# Patient Record
Sex: MALE | Race: WHITE | NOT HISPANIC OR LATINO | URBAN - METROPOLITAN AREA
[De-identification: names, ages, dates, MRNs, and addresses within clinical notes are randomized per-mention and may not be internally consistent; named-entity substitution may affect disease eponyms.]

---

## 2019-04-04 ENCOUNTER — EMERGENCY (EMERGENCY)
Facility: HOSPITAL | Age: 29
LOS: 1 days | Discharge: HOME | End: 2019-04-04
Attending: EMERGENCY MEDICINE | Admitting: EMERGENCY MEDICINE
Payer: COMMERCIAL

## 2019-04-04 VITALS
TEMPERATURE: 98 F | RESPIRATION RATE: 18 BRPM | HEART RATE: 77 BPM | OXYGEN SATURATION: 99 % | SYSTOLIC BLOOD PRESSURE: 135 MMHG | DIASTOLIC BLOOD PRESSURE: 76 MMHG

## 2019-04-04 VITALS
DIASTOLIC BLOOD PRESSURE: 90 MMHG | TEMPERATURE: 98 F | HEART RATE: 66 BPM | RESPIRATION RATE: 16 BRPM | SYSTOLIC BLOOD PRESSURE: 136 MMHG | OXYGEN SATURATION: 98 %

## 2019-04-04 PROCEDURE — 99284 EMERGENCY DEPT VISIT MOD MDM: CPT

## 2019-04-04 NOTE — ED ADULT NURSE NOTE - OBJECTIVE STATEMENT
Facilitator RN - Pt. received in room 21, A&Ox4, ambulatory. Pt. c/o left testicular pain that began at 7:45 pm tonight. Pt. reports having similar episodes in the past lasting about 10-20 minutes, did not see MD for previous episodes. Denies recent trauma, sexual activity, dysuria, abdominal pain, n/v/d, fever, chills, chest pain, SOB. Respirations are even and unlabored on room air. VS as noted. MD Nam & MD Rasmussen at bedside for evaluation. Awaiting ultrasound at present. Report given to primary RN Marii HALL

## 2019-04-04 NOTE — ED ADULT TRIAGE NOTE - CHIEF COMPLAINT QUOTE
c/o testicular pain since 7:45pm. 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 Offers no other complaints.

## 2019-04-05 LAB
APPEARANCE UR: CLEAR — SIGNIFICANT CHANGE UP
BILIRUB UR-MCNC: NEGATIVE — SIGNIFICANT CHANGE UP
BLOOD UR QL VISUAL: NEGATIVE — SIGNIFICANT CHANGE UP
COLOR SPEC: COLORLESS — SIGNIFICANT CHANGE UP
GLUCOSE UR-MCNC: NEGATIVE — SIGNIFICANT CHANGE UP
KETONES UR-MCNC: NEGATIVE — SIGNIFICANT CHANGE UP
LEUKOCYTE ESTERASE UR-ACNC: NEGATIVE — SIGNIFICANT CHANGE UP
NITRITE UR-MCNC: NEGATIVE — SIGNIFICANT CHANGE UP
PH UR: 8 — SIGNIFICANT CHANGE UP (ref 5–8)
PROT UR-MCNC: NEGATIVE — SIGNIFICANT CHANGE UP
SP GR SPEC: 1.01 — SIGNIFICANT CHANGE UP (ref 1–1.04)
UROBILINOGEN FLD QL: NORMAL — SIGNIFICANT CHANGE UP

## 2019-04-05 PROCEDURE — 76870 US EXAM SCROTUM: CPT | Mod: 26

## 2019-04-05 NOTE — ED PROVIDER NOTE - OBJECTIVE STATEMENT
Attending/Eneida: 30 yo M p/w atraumatic acute left testicular pain. Pt noted onset ~1930 tonight, 5/10 now reduced to 1-2/10. Denies penile d/c, dysuria, abd pain. He notes prior similar episodes however did not follow up with PMD or a urologist for further evaluation./

## 2019-04-05 NOTE — ED PROVIDER NOTE - CLINICAL SUMMARY MEDICAL DECISION MAKING FREE TEXT BOX
A/P 28 yo M p/w mild to mod left testicular pain, unlikely torsion, epididymitis, no masses  -US, UA,  f/i

## 2019-04-05 NOTE — ED PROVIDER NOTE - NSFOLLOWUPINSTRUCTIONS_ED_ALL_ED_FT
Please follow up with your primary care physician in 24 to 48 hours.  If you have worsening testicular pain, please return to the ER.  Please follow up with a Urologist for further management of your testicular pain and bilateral hydroceles.

## 2019-04-05 NOTE — ED PROVIDER NOTE - PHYSICAL EXAMINATION
Attending/Eneida: Well-appearing, NAD; PERRL/EOMI, non-icterus, supple, no KANDACE, no JVD, RRR, CTAB; Abd-soft, NT/ND, no HSM; no LE edema, A&Ox3, nonfocal; Skin-warm/dry  -testicles normal lie, no masses or PT, normal cremasteric, no penile d/c, no femoral KANDACE; no hernias

## 2019-04-06 LAB
BACTERIA UR CULT: SIGNIFICANT CHANGE UP
SPECIMEN SOURCE: SIGNIFICANT CHANGE UP

## 2019-05-14 PROBLEM — Z78.9 OTHER SPECIFIED HEALTH STATUS: Chronic | Status: ACTIVE | Noted: 2019-04-05

## 2019-05-29 PROBLEM — Z00.00 ENCOUNTER FOR PREVENTIVE HEALTH EXAMINATION: Status: ACTIVE | Noted: 2019-05-29

## 2019-06-28 ENCOUNTER — APPOINTMENT (OUTPATIENT)
Dept: UROLOGY | Facility: CLINIC | Age: 29
End: 2019-06-28
Payer: COMMERCIAL

## 2019-06-28 VITALS
DIASTOLIC BLOOD PRESSURE: 81 MMHG | WEIGHT: 221 LBS | SYSTOLIC BLOOD PRESSURE: 135 MMHG | HEIGHT: 69 IN | BODY MASS INDEX: 32.73 KG/M2 | HEART RATE: 77 BPM

## 2019-06-28 DIAGNOSIS — Z78.9 OTHER SPECIFIED HEALTH STATUS: ICD-10-CM

## 2019-06-28 DIAGNOSIS — I86.1 SCROTAL VARICES: ICD-10-CM

## 2019-06-28 DIAGNOSIS — N43.3 HYDROCELE, UNSPECIFIED: ICD-10-CM

## 2019-06-28 PROCEDURE — 99203 OFFICE O/P NEW LOW 30 MIN: CPT

## 2019-06-28 RX ORDER — FEXOFENADINE HYDROCHLORIDE 180 MG/1
180 TABLET, FILM COATED ORAL
Refills: 0 | Status: ACTIVE | COMMUNITY

## 2019-06-28 NOTE — REVIEW OF SYSTEMS
[Fever] : no fever [Shortness Of Breath] : no shortness of breath [Chest Pain] : no chest pain [Dysuria] : no dysuria [Constipation] : no constipation [Confused] : no confusion

## 2019-06-28 NOTE — ASSESSMENT
[Hydrocele of Testis (603.9\N43.3)] : ~T synovium and tendon of elbow [FreeTextEntry1] : None palpable varicoceles or hydroceles therefore diagnosis is bilateral subclinical hydroceles and left subclinical varicocele. There is no testicular disparity in terms of size. He has a recent child. He likely continues to be fertile but I did offer semen analysis to confirm which he declines at this time. He'll return to office p.r.n. and certainly if he is having difficulties conceiving in the near future if then semen analysis would be in order.  Recommend nonsteroidal anti-inflammatories p.r.n. pain occurs never on empty stomach

## 2019-06-28 NOTE — PHYSICAL EXAM
[General Appearance - In No Acute Distress] : no acute distress [] : no respiratory distress [Scrotum] : the scrotum was normal [Epididymis] : the epididymides were normal [Abdomen Hernia] : no hernia was discovered [Testes Mass (___cm)] : there were no testicular masses [Testes Tenderness] : no tenderness of the testes [Oriented To Time, Place, And Person] : oriented to person, place, and time [Normal Station and Gait] : the gait and station were normal for the patient's age

## 2019-06-28 NOTE — HISTORY OF PRESENT ILLNESS
[FreeTextEntry1] : 29-year-old had some mild orchialgia bilaterally recently went to emergency room at [facility]. Sonogram showed no testicular problems but small bilateral hydroceles and small left varicocele. He has one child who is of toddler range and is planning future fertility. He went to fertility specialist as his wife has polycystic ovaries. He has never had a formal semen analysis.Currently he feels well but has intermittent orchialgia

## 2020-06-22 ENCOUNTER — TRANSCRIPTION ENCOUNTER (OUTPATIENT)
Age: 30
End: 2020-06-22

## 2022-05-17 ENCOUNTER — APPOINTMENT (OUTPATIENT)
Dept: OTOLARYNGOLOGY | Facility: CLINIC | Age: 32
End: 2022-05-17

## 2022-05-17 ENCOUNTER — APPOINTMENT (OUTPATIENT)
Dept: OTOLARYNGOLOGY | Facility: CLINIC | Age: 32
End: 2022-05-17
Payer: COMMERCIAL

## 2022-05-17 PROCEDURE — 99203 OFFICE O/P NEW LOW 30 MIN: CPT | Mod: 25

## 2022-05-17 PROCEDURE — 92557 COMPREHENSIVE HEARING TEST: CPT

## 2022-05-17 PROCEDURE — 92550 TYMPANOMETRY & REFLEX THRESH: CPT

## 2022-05-17 NOTE — PHYSICAL EXAM
[Normal] : mucosa is normal [Midline] : trachea located in midline position [] : Shubert-Hallpike test is negative

## 2022-05-17 NOTE — ASSESSMENT
[FreeTextEntry1] : I reviewed, interpreted, and discussed the Audiogram done today. mild snhl.\par \par Patient to do Jayton-hallpike exercises at home.

## 2022-05-17 NOTE — HISTORY OF PRESENT ILLNESS
[de-identified] : Patient presents today c/o vertigo -   Patient states he has been suffering from vertigo for 15 years .   His ear itchy and  has some pressure behind ears .  He does have seasonal allergies.   Yesterday he was getting room spinning dizziness but today he has just light imbalance.

## 2022-05-27 ENCOUNTER — APPOINTMENT (OUTPATIENT)
Dept: OTOLARYNGOLOGY | Facility: CLINIC | Age: 32
End: 2022-05-27
Payer: COMMERCIAL

## 2022-05-27 DIAGNOSIS — R42 DIZZINESS AND GIDDINESS: ICD-10-CM

## 2022-05-27 PROCEDURE — 99213 OFFICE O/P EST LOW 20 MIN: CPT

## 2022-05-27 NOTE — HISTORY OF PRESENT ILLNESS
[FreeTextEntry1] : Patient following up today on vertigo.  Patient states he is feeling better .  No more dizziness.  Patient c/o a few excess sneezing episodes.

## 2022-05-27 NOTE — REASON FOR VISIT
[facility] EMERGENCY DEPT 
[address] [id number] 
[phone] Work/School Note Date: 2/8/2018 To Whom It May concern: 
 
Lenoard Harada. was seen and treated today in the emergency room by the following provider(s): 
Attending Provider: Alicja Quiroz MD 
Nurse Practitioner: Juan M Rojas NP. Lenoard Harada. may return to work on 02/11/2018. Sincerely, Juan M Rojas NP 
 
 
 
 [Subsequent Evaluation] : a subsequent evaluation for [FreeTextEntry2] : vertigo

## 2022-05-27 NOTE — ASSESSMENT
[FreeTextEntry1] : resolved BPPV.\par \par patient advised about risk of recurrence in the future.\par \par Patient to go for MRI.\par

## 2024-02-22 NOTE — ED ADULT NURSE NOTE - PAIN RATING/NUMBER SCALE (0-10): REST
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  Patient has been oriented to the observation room, bathroom and call light is in place.    Discussed discharge goals and expectations with patient/family.            4

## 2024-04-01 ENCOUNTER — NON-APPOINTMENT (OUTPATIENT)
Age: 34
End: 2024-04-01